# Patient Record
(demographics unavailable — no encounter records)

---

## 2020-10-22 NOTE — OR
[facility]
                                    [address], [place]  [address]
_________________________________________________________________________________________
                                                                 Signed   
 
 
DATE OF OPERATION:
10/22/2020
 
SURGEON:
Noah Lara MD
 
PREOPERATIVE DIAGNOSIS:
Sister with colonic polyps in her late 50s.
 
POSTOPERATIVE DIAGNOSES:
1. Minimal internal hemorrhoids.
2. Sessile cecal/ileocecal valve mass (__________/clip) and polyp #2.
3. 5 mm polyp proximal right colon.
4. 5 mm polyp at 50 cm.
5. 5 mm polyp at 25 cm.
6. 7 mm polyp at 20 cm.
7. 5 mm polyp at 15 cm (rectum).
 
PROCEDURE:
Colonoscopy with snare polypectomy, hot biopsy, and placement of clip.
 
ESTIMATED BLOOD LOSS:
None.
 
INDICATIONS:
Benedict is a 58-year-old diabetic gentleman, asked to see me for his initial
colonoscopy.  He explained that his sister had colonic polyps removed in her late 50s.
She has to go every 5 years for her procedures.  Benedict really has no lower GI
complaints.  I had given him a pamphlet on colonoscopy and he understands the nature of
that test along with its risks including, but not limited to gas bloating, crampy
abdominal pain, bleeding, perforation requiring surgery, and missed diagnosis.  He also
understands the need for IV conscious sedation.  He had expressed understanding and
wished to proceed. 
 
PROCEDURE NOTE:
Benedict was taken into our endoscopy suite and placed in the left lateral decubitus
position.  He was given a total of 8 mg of Versed and 200 mcg of fentanyl to cover the
case.  A digital rectal exam was performed and this was unremarkable.  The adult
colonoscope was introduced and advanced under direct visualization of the camera.  He
had a loop in his colon around 35 cm.  It took a few minutes to get through this area
and then the scope passed to about the hepatic flexure.  He needed extra sedation and
abdominal compression in order to get the scope down to the cecum itself.  His prep was
 
    Electronically Signed By: NOAH LARA MD  10/22/20 1017
_________________________________________________________________________________________
PATIENT NAME:     BENEDICT WERNER                       
MEDICAL RECORD #: [id number]            OPERATIVE REPORT              
          ACCT #: [id number]  
DATE OF BIRTH:   10/11/62            REPORT #: [id number]      
PHYSICIAN:        NOAH LARA MD             
PCP:              TRAMAINE BOYER             
REPORT IS CONFIDENTIAL AND NOT TO BE RELEASED WITHOUT AUTHORIZATION
 
 
                                  [facility]
                                    [address], [place]  [address]
_________________________________________________________________________________________
                                                                 Signed   
 
 
good.  We could easily see the appendiceal orifice and the ileocecal valve.  We took
pictures throughout for photodocumentation.  He has a sessile cecal mass along the
ileocecal valve, which we took multiple biopsies with a hot biopsy forceps along with
our snare.  He had little bleeding on 1 side, so we placed a clip and then there was
another polyp opposite of that in the cecum and we simply removed it with hot biopsy
forceps and placed it into the same jar.  As the scope was withdrawn, then we removed
the other polyps with the help of our hot biopsy forceps.  We did not see any
diverticulosis.  The scope was then retroflexed in the rectum, and he does have minimal
internal hemorrhoid tissue.  After this, the gas was suctioned out.  Colonoscope
removed.  Benedict tolerated his procedure quite well. 
 
RECOMMENDATIONS:
I will see Benedict back in my office in 7 to 14 days to review his results.  He may need
a right colectomy and/or a repeat colonoscopy depending on the pathology results for
this cecal mass. 
 
 
 
            ________________________________________
            Noah Lara MD 
 
 
ALB/MODL
Job #:  [id number]
DD:  10/22/2020 08:16:03
DT:  10/22/2020 09:44:46
 
cc:            Noah Lara MD 
 
               Chart Filed Incomplete 
 
               [facility]
 
 
Copies:  NOAH LARA MD
         CHART FILED INCOMPLETE
~
 
 
 
 
 
 
    Electronically Signed By: NOHA LARA MD  10/22/20 1017
_________________________________________________________________________________________
PATIENT NAME:     BENEDICT WERNER                       
MEDICAL RECORD #: [id number]            OPERATIVE REPORT              
          ACCT #: [id number]  
DATE OF BIRTH:   10/11/62            REPORT #: [id number]      
PHYSICIAN:        NOAH LARA MD             
PCP:              TRAMAINE BOYER             
REPORT IS CONFIDENTIAL AND NOT TO BE RELEASED WITHOUT AUTHORIZATION

## 2020-10-22 NOTE — NUR
10/22/20 0806 Paola Morton
0803- PT OT PACU IN LL POSITION EYES CLOSED. PT RESPONDS TO VERBAL
STIMULI AND DENIES PAIN. BREATHING EASY AND UNLABORED. SPO2 >95% ON 2
L O2 VIA NC. POC DISCUSSED WITH PT. PT ENCOURAGED TO PASS GAS.

## 2020-10-23 NOTE — PATH
[facility]
                                    [address], [place]  [address]
_________________________________________________________________________________________
                                                                 Signed   
 
 
 
SPECIMEN(S): A COLON POLYP AT 25 CM
SPECIMEN(S): B COLON POLYP AT 50 CM
SPECIMEN(S): C CECAL POLYP
SPECIMEN(S): D PROXIMAL ASCENDING POLYP
SPECIMEN(S): E COLON POLYP AT 20 CM
SPECIMEN(S): F COLON POLYP AT 15 CM
 
SPECIMEN SOURCE:
A. COLON POLYP AT 25 CM
B. COLON POLYP AT 50 CM
C. CECAL POLYP
D. PROXIMAL ASCENDING POLYP
E. COLON POLYP AT 20 CM
F. COLON POLYP AT 15 CM
 
CLINICAL HISTORY:
Colonoscopy.  Family history of polyps, screening.  Rule out cecal cancer, ***.
MICROSCOPIC DESCRIPTION:
Histologic sections of all submitted blocks are examined by light microscopy. 
These findings, together with the gross examination, support the pathologic 
diagnosis. 
 
FINAL PATHOLOGIC DIAGNOSIS:
A.  Colon, polyp at 25 cm, polypectomy:
-  Fragments of tubular adenoma.
-  Negative for high-grade dysplasia or malignancy.
B.  Colon, polyp at 50 cm, polypectomy:
-  Tubular adenoma.
-  Negative for high-grade dysplasia or malignancy.
C.  Colon, cecum, polyp, polypectomy:
-  Fragments of tubulovillous adenoma.
-  Negative for high-grade dysplasia or malignancy.
D.  Colon, proximal ascending, polyp, polypectomy:
-  Tubular adenoma.
-  Negative for high-grade dysplasia or malignancy.
E.  Colon, polyp at 20 cm, polypectomy:
-  Hyperplastic polyp.
-  Negative for dysplasia or malignancy.
F.  Colon, polyp at 15 cm, polypectomy:
-  Tubular adenoma.
-  Negative for high-grade dysplasia or malignancy.
 
                                                                                    
_________________________________________________________________________________________
PATIENT NAME:     YARY WERNER                       
MEDICAL RECORD #: [id number]            PATHOLOGY                     
          ACCT #: [id number]       ACCESSION #: [id number]     
DATE OF BIRTH:   10/11/62            REPORT #: [id number]       
PHYSICIAN:        GAVIN ANDRADE              
PCP:              TRAMAINE BOYER             
REPORT IS CONFIDENTIAL AND NOT TO BE RELEASED WITHOUT AUTHORIZATION
 
 
                                  [facility]
                                    [address], [place]  [address]
_________________________________________________________________________________________
                                                                 Signed   
 
 
NAL:cml:C2NR
 
GROSS DESCRIPTION:
Six specimens are received in six containers, labeled "MF."
A. The specimen, labeled "MF, #1," and designated on the requisition "colon 
polyp at 25 cm," is received in formalin and consists of one tan soft tissue 
fragment that measures 0.3 cm in greatest 
dimension.  The specimen is entirely submitted in cassette (A1).
B.  The specimen, labeled "MF, #2," and designated on the requisition "colon 
polyp at 50 cm," is received in formalin and consists of one tan soft tissue 
fragment that measures 0.3 cm in greatest 
dimension.  The specimen is entirely submitted in cassette (B1).
C.  The specimen, labeled "MF, #3," and designated on the requisition "cecal 
polyp," is received in formalin and consists of multiple tan soft tissue 
fragments that measure 0.4 cm in greatest 
dimension.  The specimen is entirely submitted in cassette (C1).
 
D.  The specimen, labeled "MF, #4," and designated on the requisition "proximal 
ascending/right polyp," is received in formalin and consists of one tan soft 
tissue fragment that measures 0.4 cm in 
greatest dimension.  The specimen is entirely submitted in cassette (D1).
E.  The specimen, labeled "MF, #5," and designated on the requisition "colon 
polyp at 20 cm," is received in formalin and consists of one tan soft tissue 
fragment that measures 0.3 cm in greatest 
dimension.  The specimen is entirely submitted in cassette (E1).
F.   The specimen, labeled "MF #6," and designated on the requisition "colon 
polyp at 15 cm," is received in formalin and consists of one tan soft tissue 
fragment that measures 0.3 cm in greatest 
dimension.  The specimen is entirely submitted in cassette (F1).
AT (under the direct supervision of a pathologist)
 
The Gross Description was prepared using a voice recognition system.  The 
report was reviewed for accuracy; however, sound-alike word errors, addition 
and/or deletions may occur.  If there is any 
question about this report, please contact Client Services.
 
PERFORMING LABORATORY:
The technical component was performed by [facility] [address] (Medical Director: Steph Nash MD; CLIA# [id number]). 
Professional interpretation was performed by 
[facility], [address] [address] (CLIA# [id number]). 
 
                                                                                    
_________________________________________________________________________________________
PATIENT NAME:     YARY WERNER                       
MEDICAL RECORD #: [id number]            PATHOLOGY                     
          ACCT #: [id number]       ACCESSION #: [id number]     
DATE OF BIRTH:   10/11/62            REPORT #: [id number]       
PHYSICIAN:        GAVIN ANDRADE              
PCP:              TRAMAINE BOYER             
REPORT IS CONFIDENTIAL AND NOT TO BE RELEASED WITHOUT AUTHORIZATION
 
 
                                  [facility]
                                    [address], [place]  [address]
_________________________________________________________________________________________
                                                                 Signed   
 
 
 
Diagnostician:  Marah Bowen MD
Pathologist
Electronically Signed 10/23/2020
 
 
Copies:                                
~
 
 
 
 
 
 
 
 
 
 
 
 
 
 
 
 
 
 
 
 
 
 
 
 
 
 
 
 
 
 
 
 
 
 
 
                                                                                    
_________________________________________________________________________________________
PATIENT NAME:     YARY WERNER                       
MEDICAL RECORD #: [id number]            PATHOLOGY                     
          ACCT #: [id number]       ACCESSION #: [id number]     
DATE OF BIRTH:   10/11/62            REPORT #: [id number]       
PHYSICIAN:        GAVIN PATHOLOGY              
PCP:              TRAMAINE BOYER             
REPORT IS CONFIDENTIAL AND NOT TO BE RELEASED WITHOUT AUTHORIZATION